# Patient Record
Sex: FEMALE | Race: WHITE | NOT HISPANIC OR LATINO
[De-identification: names, ages, dates, MRNs, and addresses within clinical notes are randomized per-mention and may not be internally consistent; named-entity substitution may affect disease eponyms.]

---

## 2021-06-03 PROBLEM — Z00.00 ENCOUNTER FOR PREVENTIVE HEALTH EXAMINATION: Status: ACTIVE | Noted: 2021-06-03

## 2022-10-12 ENCOUNTER — APPOINTMENT (OUTPATIENT)
Dept: RADIOLOGY | Facility: CLINIC | Age: 72
End: 2022-10-12

## 2022-10-12 PROCEDURE — 73502 X-RAY EXAM HIP UNI 2-3 VIEWS: CPT | Mod: LT

## 2022-10-12 PROCEDURE — 73610 X-RAY EXAM OF ANKLE: CPT | Mod: LT

## 2023-06-19 ENCOUNTER — FORM ENCOUNTER (OUTPATIENT)
Age: 73
End: 2023-06-19

## 2024-04-03 ENCOUNTER — APPOINTMENT (OUTPATIENT)
Dept: PHYSICAL MEDICINE AND REHAB | Facility: CLINIC | Age: 74
End: 2024-04-03
Payer: MEDICARE

## 2024-04-03 PROCEDURE — 20553 NJX 1/MLT TRIGGER POINTS 3/>: CPT

## 2024-04-03 PROCEDURE — 99205 OFFICE O/P NEW HI 60 MIN: CPT

## 2024-04-03 RX ORDER — RAMIPRIL 5 MG/1
CAPSULE ORAL
Refills: 0 | Status: ACTIVE | COMMUNITY

## 2024-04-03 RX ORDER — CYCLOBENZAPRINE HYDROCHLORIDE 5 MG/1
5 TABLET, FILM COATED ORAL TWICE DAILY
Qty: 40 | Refills: 0 | Status: ACTIVE | COMMUNITY
Start: 2024-04-03 | End: 1900-01-01

## 2024-04-03 RX ORDER — METHYLPREDNISOLONE 4 MG/1
4 TABLET ORAL
Qty: 1 | Refills: 0 | Status: ACTIVE | COMMUNITY
Start: 2024-04-03 | End: 1900-01-01

## 2024-04-03 RX ORDER — BISOPROLOL FUMARATE 5 MG/1
TABLET, FILM COATED ORAL
Refills: 0 | Status: ACTIVE | COMMUNITY

## 2024-04-03 NOTE — ASSESSMENT
[FreeTextEntry1] : MRI L/S   Medrol dose pack 4mg take as directed.   Flexeril 5mg po bid PRN #40   Home exercise plan reviewed with patient. Stressed the importance for the need for frequent change in position from sit to stand and stand to sit, as well as use of weight shifting techniques to alleviate low back discomfort. Instruction in proper posturing, body mechanics and lifting techniques.   At least 60 minutes was spent with patient face to face examining patient, reviewing findings/results, counseling patient and coordinating treatment program. Ample time was provided to answer any questions or address concerns to the patients satisfaction.  Recheck after MRI of the L/S is obtained for appropriate tx plan.

## 2024-04-03 NOTE — END OF VISIT
[FreeTextEntry3] :  The following information has been documented by Carolyn Munoz acting as a scribe. The documentation recorded by the scribe, in my presence, accurately reflects the service I, Dr. Kohli, personally performed, and the decisions made by me with my edits as appropriate.  [Time Spent: ___ minutes] : I have spent [unfilled] minutes of time on the encounter.

## 2024-04-04 ENCOUNTER — APPOINTMENT (OUTPATIENT)
Dept: MRI IMAGING | Facility: CLINIC | Age: 74
End: 2024-04-04
Payer: MEDICARE

## 2024-04-04 PROCEDURE — 72148 MRI LUMBAR SPINE W/O DYE: CPT | Mod: MH

## 2024-04-08 ENCOUNTER — APPOINTMENT (OUTPATIENT)
Dept: PHYSICAL MEDICINE AND REHAB | Facility: CLINIC | Age: 74
End: 2024-04-08
Payer: MEDICARE

## 2024-04-08 DIAGNOSIS — I10 ESSENTIAL (PRIMARY) HYPERTENSION: ICD-10-CM

## 2024-04-08 DIAGNOSIS — M51.9 UNSPECIFIED THORACIC, THORACOLUMBAR AND LUMBOSACRAL INTERVERTEBRAL DISC DISORDER: ICD-10-CM

## 2024-04-08 PROCEDURE — 99213 OFFICE O/P EST LOW 20 MIN: CPT

## 2024-04-09 PROBLEM — I10 HYPERTENSION, UNSPECIFIED TYPE: Status: ACTIVE | Noted: 2024-04-03

## 2024-04-09 NOTE — ASSESSMENT
[FreeTextEntry1] : Surgical consult Dr. Astorga-evaluate L1 compression fracture as well as severe lumbar spinal stenosis Recheck after consult   At least 20 minutes was spent with patient face to face examining patient, reviewing findings/results, counseling patient and coordinating treatment program. Ample time was provided to answer any questions or address concerns to the patient's satisfaction.

## 2024-04-09 NOTE — HISTORY OF PRESENT ILLNESS
[FreeTextEntry1] : Pt continues with c/o low back pain with radicular symptoms involving her left lower extremity. Pt reports was unable to tolerate MDP reports elevated BP. Pt states she went to the ER MDP was D/C. Pt was prescribed Amlodipine for hypertension and Percocet for pain and was discharged.  Pt reports some improvement following last sessions of TPI therapy.  MRI lumbar spine reviewed with patient/daughter patient is Martiniquais-speaking.

## 2024-04-09 NOTE — PHYSICAL EXAM
[FreeTextEntry1] : EXAMINATION OF LUMBAR SPINE & LOWER EXTREMITIES:   Reflexes (R) L/E:                   Quadriceps 2+                   Achilles 2+ Reflexes (L) L/E:                    Quadriceps 2+                    Achilles 2+   Sensory L/E: decrease sensation involving the left L4/L5 dermatome distribution.    Antalgic Gait patient using straight cane favoring left lower extremity.    Testing:                Babinski [ down going bilaterally] SI Jt Lig.Challenege Test (R) - SI Jt Lig.Challenege Test (L) + S.L.R. ( R ) -60 degrees S.L.R. ( L ) + 40 degrees  Range of motion testing was performed with the use of a goniometer.                           Flexion: 45 degrees (normal - 75-90)                           Extension: 5 degrees (normal - 30)                           Lateral Bending ( R ):20 degrees  (normal - 35)                           Lateral Bending ( L ):10 degrees (normal - 35)                           Thoracic Rotation ( R ): 25 degrees (normal - 35)                           Thoracic Rotation ( L ): 15 degrees (normal - 35)                           Internal Rotation Femur ( R ):WNL                           External Rotation Femur ( R ):WNL                            Internal Rotation Femur ( L ): mild to moderate restriction                            External Rotation Femur ( L ): WNL MMT: bilateral hip flexion 4+/5 volitional efforts compromised by pain. Left EHL 4+/5  Palpation of the Lumbar Spine: isolated trigger points identified left glutes maximums and Medius musculature.  Patient presents for evaluation of Lumbar Spine pain.    After today's examination additional trigger points were identified involving the left glutes maximums and Medius musculature.  , thus indicating the need for trigger point therapy.  Goals of which are to decrease pain, dissipate muscle spasm, increase ROM and improve level of function.   Sterile Technique Injection 2 Syringes of 5 cc 1 % Lidocaine HCL  left glutes maximums and Medius musculature.   Ice injection site PRN  Injection tolerated well.     Multiple areas were identified using palpation guidance. Using aseptic technique, each of these areas  left glutes maximums and Medius musculature.  were isolated and the skin prepped with alcohol.  A 22 gauge 1 1/2 inch needle was inserted to the level of the muscle. At this point, a slight twitch was elicited and in some cases the patient identified referred pain to areas distant from the injection site.  All of these were consistent with the definition of a trigger point.  Aspiration revealed no blood and a mixture of 5cc 1 % Lidocaine HCL was injected in increments of 3-4 mL into each of these areas for a total of 4 sites. The needle was removed. Hemostasis was achieved with direct pressure.  The patient tolerated the procedure well. Post-procedure exam did not reveal any new neurological deficits. The patient was instructed to call with fever, chills, increased pain, redness or swelling at the injection site, or numbness or weakness in the lower extremities. The patient was discharged home in good condition with post-procedural instructions.

## 2024-04-10 ENCOUNTER — APPOINTMENT (OUTPATIENT)
Dept: NEUROLOGY | Facility: CLINIC | Age: 74
End: 2024-04-10
Payer: MEDICARE

## 2024-04-10 VITALS
DIASTOLIC BLOOD PRESSURE: 76 MMHG | WEIGHT: 105 LBS | SYSTOLIC BLOOD PRESSURE: 131 MMHG | OXYGEN SATURATION: 98 % | HEIGHT: 60 IN | BODY MASS INDEX: 20.62 KG/M2 | HEART RATE: 78 BPM

## 2024-04-10 DIAGNOSIS — H93.19 TINNITUS, UNSPECIFIED EAR: ICD-10-CM

## 2024-04-10 PROCEDURE — 99205 OFFICE O/P NEW HI 60 MIN: CPT

## 2024-04-10 RX ORDER — MECLIZINE HYDROCHLORIDE 12.5 MG/1
12.5 TABLET ORAL
Qty: 30 | Refills: 3 | Status: COMPLETED | COMMUNITY
Start: 2024-04-10 | End: 2024-06-09

## 2024-04-10 RX ORDER — AZELASTINE HYDROCHLORIDE 137 UG/1
137 SPRAY, METERED NASAL
Qty: 30 | Refills: 0 | Status: ACTIVE | COMMUNITY
Start: 2024-04-05

## 2024-04-10 RX ORDER — AMLODIPINE BESYLATE 5 MG/1
5 TABLET ORAL
Qty: 14 | Refills: 0 | Status: ACTIVE | COMMUNITY
Start: 2024-04-07

## 2024-04-10 RX ORDER — OXYCODONE AND ACETAMINOPHEN 5; 325 MG/1; MG/1
5-325 TABLET ORAL
Qty: 12 | Refills: 0 | Status: ACTIVE | COMMUNITY
Start: 2024-04-07

## 2024-04-10 NOTE — REVIEW OF SYSTEMS
[As Noted in HPI] : as noted in HPI [Loss Of Hearing] : hearing loss [Negative] : Heme/Lymph [FreeTextEntry4] : tinnitus

## 2024-04-10 NOTE — PHYSICAL EXAM
[FreeTextEntry1] : GENERAL PHYSICAL EXAM: GEN: no distress, normal affect EYES: sclera white, conjunctiva clear, no nystagmus MSK: muscle tone and strength normal SKIN: warm, dry, no rash or lesion on exposed skin   NEUROLOGICAL EXAM: Mental Status Orientation: alert and oriented to person, place, time, and situation Language: clear and fluent, intact comprehension and repetition, Faroese Speaking   Cranial Nerves II: visual fields full to confrontation III, IV, VI: PERRL, EOMI V, VII: facial sensation and movement intact and symmetric VIII: hearing diminished bilaterally, perforated eardrums visualized bilaterally IX, X: uvula midline, soft palate elevates normally XI: BL shoulder shrug intact XII: tongue midline   Motor Bilateral muscle strength 5/5 in UE and LE, proximally and distally Tone and bulk are normal in upper and lower limbs   Sensation Intact to light touch in all 4 EXTs   Coordination Normal FTN bilaterally   Gait Normal stance, stride, and pivot turn

## 2024-04-10 NOTE — ASSESSMENT
[FreeTextEntry1] : Teetee Kennedy is a 74YO female, presenting today for bilateral ear ringing. On examination patient has severely perforated ear drums bilaterally, and therefore this is likely the cause of tinnitus and dizziness. Pt was strongly encouraged to consider the corrective surgery offered by the ENT.   Plan: - Follow-up with ENT again to discuss surgical options for treatment of tinnitus and perforated ear drums. - Follow-up with audiologist for hearing aids, to possibly help with the tinnitus.  - MRI Brain with IACs and MRA Head and Neck to rule out additional causes for tinnitus. - Recommend the patient try adding white noise to her home, especially at night to aid her sleeping. This has been shown to reduce the significance of tinnitus in some patients. - Labs to assess for reversible causes for dizziness.  - Meclizine 12.5mg as needed for dizziness.  - Will follow-up with results via telephone.   send copy of note to  [phone].

## 2024-04-10 NOTE — HISTORY OF PRESENT ILLNESS
[FreeTextEntry1] : Teetee Kennedy is a 72YO female, presenting today for ringing in the ears, referred by PCP. PMH of poorly controlled HTN, lumbar radiculopathy and herniated discs. She had MRI and MRA Head and Neck head 10/22, negative. Started 2 years ago, consistent buzzing in her head most of the time, she feels off balance, not dizzy. When she lays in bed its worse, and sometimes she hears a loud thump. She has seen ENT and they recommended surgery, but patient and her daughter did not feel that was necessary. She does report 75% hearing loss, has seen audiologist. She reports being outside makes the ringing less, worse when she is indoors in quiet setting. No other neurological complaints at this time.

## 2024-04-10 NOTE — DATA REVIEWED
[de-identified] : 10/2022: IMPRESSION: 1. No evidence of acute pathology. 2. Fluid-filled right mastoid air cells which is age indeterminate and may be chronic. Mastoiditis cannot be excluded. Please correlate clinically.  Final report: Preliminary report. Age-appropriate involutional and ischemic gliotic changes. [de-identified] : MRA Neck: 10/22: Right common carotid artery: No stenosis. No dissection or occlusion. Right internal carotid artery: No stenosis of the extracranial segment. No dissection or occlusion. Right external carotid artery: No stenosis. No dissection or occlusion of the origin. Right vertebral artery: No stenosis. No dissection or occlusion.  Left common carotid artery: No stenosis. No dissection or occlusion. Left internal carotid artery: No stenosis of the extracranial segment. No dissection or occlusion. Left external carotid artery: No stenosis. No dissection or occlusion of the origin. Left vertebral artery: No stenosis. No dissection or occlusion.  IMPRESSION: No evidence of vascular pathology.   MRA Head 10/22: POSTERIOR CIRCULATION: Right vertebral artery: No occlusion or significant stenosis. No aneurysm. Left vertebral artery: No occlusion or significant stenosis. No aneurysm. Basilar artery: No occlusion or significant stenosis. No aneurysm. Right posterior cerebral artery: No occlusion or significant stenosis. No aneurysm. Left posterior cerebral artery: No occlusion or significant stenosis. No aneurysm.  IMPRESSION: No evidence of vascular pathology.

## 2024-04-15 ENCOUNTER — APPOINTMENT (OUTPATIENT)
Dept: ORTHOPEDIC SURGERY | Facility: CLINIC | Age: 74
End: 2024-04-15
Payer: MEDICARE

## 2024-04-15 DIAGNOSIS — Z86.79 PERSONAL HISTORY OF OTHER DISEASES OF THE CIRCULATORY SYSTEM: ICD-10-CM

## 2024-04-15 DIAGNOSIS — Z86.39 PERSONAL HISTORY OF OTHER ENDOCRINE, NUTRITIONAL AND METABOLIC DISEASE: ICD-10-CM

## 2024-04-15 PROCEDURE — 99214 OFFICE O/P EST MOD 30 MIN: CPT

## 2024-04-15 NOTE — ASSESSMENT
[FreeTextEntry1] : 4/2024 MRI of L-Spine was reviewed today and is as follows:  Acute compression fracture L1 Chronic compression fracture L2 Congenital stenosis Severe stenosis L2-S1, worst at L2-3  74 yo female presents today for eval of her low back pain. MRI detailed above shows acute compression fracture and stenosis. I recommend bracing to allow fracture to heal at this time. Once fracture is healed, may consider TERESA for relief in low back pain.   - Patient given RX for LSO brace to provide stability and facilitate healing following injury to the lumbar spine.   - Recommend NSAIDs PRN - Recommend heating pad use to decrease muscle spasm - Discussed the importance of home exercises, including but not limited to hamstring stretching and core strengthening   Patient was educated on their diagnosis today. All questions answered and patient expressed understanding.  Follow up in 2 months

## 2024-04-15 NOTE — PHYSICAL EXAM
[de-identified] : Constitutional: Well groomed and developed.  Respiratory: Normal, unlabored breathing. No use of accessory muscles.  Skin: No rashes or ulcers. Skin warm and dry.  Psychiatric: Oriented to time, place, person and event. No acute distress. Gait: Ambulates with cane on the right    Lumbar spine:  Posture: Normal on coronal and sagittal alignment  AROM:  Flexion 60  Extension 10  Moderate pain with simulated truncal motion   Tenderness:  Thoracic: No tenderness on palpation  Lumbar: Moderate tenderness on palpation     TTP bilaterally L5-S1 Sacrum/coccyx: no tenderness on palpation  Greater trochanteric bursa: TTP on the left  PSIS: TTP on the left    Motor:                         R             L LE:                                IS                    5             5 Quad              5             5 TA                  5             5 EHL                5             5 Gastroc         5             5                 R  L DTR: Patella  2+  2+ Achilles  2+  2+  Sensory: Light touch sensation intact T12-S1  Babinski's Sign: Negative bilaterally  Straight leg raise test: Negative bilaterally  CARLOS test: negative bilaterally

## 2024-04-15 NOTE — HISTORY OF PRESENT ILLNESS
[de-identified] : (Pts daughter is translating from Bhutanese) Patient presents today with lower back pain ongoing for years- worsening x~3 weeks after coughing.  Pt reports pain and tightness in bilat sides of lower back and bilat hips  Reports pain radiating down left leg into the foot  Reports pain is worse with prolonged sitting, standing, walking, bending, etc Pt is taking Oxycodone 5mg hs with relief to sleep- was given prednisolone but had to go to ER with elevated BP  Pt denies treatments for her back in the past  Pt had MRI at [facility] per Dr Kohli  Pt is using a cane for ambulaiton.

## 2024-04-15 NOTE — REASON FOR VISIT
Preventive Health Recommendations  Female Ages 40 to 49    Yearly exam:     See your health care provider every year in order to  1. Review health changes.   2. Discuss preventive care.    3. Review your medicines if your doctor prescribed any.      Get a Pap test every three years (unless you have an abnormal result and your provider advises testing more often).      If you get Pap tests with HPV test, you only need to test every 5 years, unless you have an abnormal result. You do not need a Pap test if your uterus was removed (hysterectomy) and you have not had cancer.      You should be tested each year for STDs (sexually transmitted diseases), if you're at risk.     Ask your doctor if you should have a mammogram.      Have a colonoscopy (test for colon cancer) if someone in your family has had colon cancer or polyps before age 50.       Have a cholesterol test every 5 years.       Have a diabetes test (fasting glucose) after age 45. If you are at risk for diabetes, you should have this test every 3 years.    Shots: Get a flu shot each year. Get a tetanus shot every 10 years.     Nutrition:     Eat at least 5 servings of fruits and vegetables each day.    Eat whole-grain bread, whole-wheat pasta and brown rice instead of white grains and rice.    Get adequate Calcium and Vitamin D.      Lifestyle    Exercise at least 150 minutes a week (an average of 30 minutes a day, 5 days a week). This will help you control your weight and prevent disease.    Limit alcohol to one drink per day.    No smoking.     Wear sunscreen to prevent skin cancer.    See your dentist every six months for an exam and cleaning.   [FreeTextEntry2] : Back pain ongoing for years- increased ~4/1/2024 after coughing

## 2024-04-15 NOTE — DATA REVIEWED
[MRI] : MRI [Lumbar Spine] : lumbar spine [I independently reviewed and interpreted images and report] : I independently reviewed and interpreted images and report [FreeTextEntry1] : 4/2024 MRI of L-Spine was reviewed today and is as follows:  Acute compression fracture L1 Chronic compression fracture L2 Congenital stenosis Severe stenosis L2-S1, worst at L2-3

## 2024-04-19 ENCOUNTER — NON-APPOINTMENT (OUTPATIENT)
Age: 74
End: 2024-04-19

## 2024-05-28 ENCOUNTER — APPOINTMENT (OUTPATIENT)
Dept: NEUROLOGY | Facility: CLINIC | Age: 74
End: 2024-05-28
Payer: MEDICARE

## 2024-05-28 VITALS
WEIGHT: 105 LBS | DIASTOLIC BLOOD PRESSURE: 73 MMHG | HEIGHT: 60 IN | HEART RATE: 80 BPM | BODY MASS INDEX: 20.62 KG/M2 | SYSTOLIC BLOOD PRESSURE: 146 MMHG | OXYGEN SATURATION: 99 %

## 2024-05-28 DIAGNOSIS — M54.16 RADICULOPATHY, LUMBAR REGION: ICD-10-CM

## 2024-05-28 DIAGNOSIS — R42 DIZZINESS AND GIDDINESS: ICD-10-CM

## 2024-05-28 DIAGNOSIS — H93.13 TINNITUS, BILATERAL: ICD-10-CM

## 2024-05-28 DIAGNOSIS — R20.0 ANESTHESIA OF SKIN: ICD-10-CM

## 2024-05-28 PROCEDURE — G2211 COMPLEX E/M VISIT ADD ON: CPT

## 2024-05-28 PROCEDURE — 99215 OFFICE O/P EST HI 40 MIN: CPT

## 2024-05-28 RX ORDER — METHOCARBAMOL 500 MG/1
500 TABLET, FILM COATED ORAL
Qty: 30 | Refills: 1 | Status: ACTIVE | COMMUNITY
Start: 2024-05-28 | End: 1900-01-01

## 2024-05-28 RX ORDER — MELOXICAM 5 MG/1
5 CAPSULE ORAL
Qty: 30 | Refills: 1 | Status: ACTIVE | COMMUNITY
Start: 2024-05-28 | End: 1900-01-01

## 2024-05-28 NOTE — HISTORY OF PRESENT ILLNESS
[FreeTextEntry1] : Today, 5/28/24: MRA of the head and neck were negative for any acute findings, MRI of the head just showed left mastoid effusion suggestive of possible ear infection or sinus infection.  she saw Ortho for lower back pain recently, MRI of lumbar spine performed, which showed burst compression fracture at L1 and chronic compression fractures at L2.  Was given a brace to wear but feels it's so heavy it makes the pain worse. Mostly just wearing it for ambulation as tolerated.  She saw ENT for sinus infections in the past, but not recently.  Saw Pain management and was given 2 rounds of steroid injection but no improvement.  No new complaints.   Teetee Kennedy is a 74YO female, presenting today for ringing in the ears, referred by PCP. PMH of poorly controlled HTN, lumbar radiculopathy and herniated discs. She had MRI and MRA Head and Neck head 10/22, negative. Started 2 years ago, consistent buzzing in her head most of the time, she feels off balance, not dizzy. When she lays in bed its worse, and sometimes she hears a loud thump. She has seen ENT and they recommended surgery, but patient and her daughter did not feel that was necessary. She does report 75% hearing loss, has seen audiologist. She reports being outside makes the ringing less, worse when she is indoors in quiet setting. No other neurological complaints at this time.

## 2024-05-28 NOTE — ASSESSMENT
[FreeTextEntry1] : Teetee Kennedy is a 72YO female, presenting today for follow-up for tinnitus and MRI results.  MRI of the brain was negative except for left mastoid effusion suggestive of ear infections or sinus infection.  Patient does report a history of sinus infection and a pain in that area therefore this is the likely cause, and could be related to the dizziness.  Suggest patient return to ENT for sinus evaluation.  Patient is more concerned about lower back pain at this time and with daughter's help for translation reports the brace she was given by orthopedics is not helpful.  Patient has seen pain management and was given 2 rounds of steroid injections with no notable improvement.  Patient and daughter are curious if there is any other relief for this back pain.  Neurological exam remains unremarkable at this time.  Plan: -MRI of the cervical spine to assess for causes for cervicalgia some dizziness. -Referral to neurosurgery to assess lumbar compression fractures and spinal stenosis.  Assess if there are other options aside from the brace for pain management. - Physical therapy referral for when cleared from neurosurgeon to begin exercise, for lower back pain and numbness of the lower extremities. - Start meloxicam 5 mg twice daily as needed for chronic back pain.  Discussed the risks of NSAIDs with elevated blood pressure and encouraged to trial half a tablet and assess blood pressure while taking this medication. -Start methocarbamol 500 mg twice a day as needed for muscle spasms of the lower back.  Again, encouraged the patient to start with a half a tablet or 250 mg to assess for tolerance as muscle relaxers can cause fatigue and dizziness.  Patient encouraged not to use Flexeril any longer as this is not recommended and patient 73 years old. -Follow-up in 6 weeks after imaging and referrals completed.

## 2024-06-04 ENCOUNTER — APPOINTMENT (OUTPATIENT)
Dept: NEUROSURGERY | Facility: CLINIC | Age: 74
End: 2024-06-04
Payer: MEDICARE

## 2024-06-04 VITALS
HEART RATE: 80 BPM | BODY MASS INDEX: 20.62 KG/M2 | HEIGHT: 60 IN | WEIGHT: 105 LBS | SYSTOLIC BLOOD PRESSURE: 140 MMHG | DIASTOLIC BLOOD PRESSURE: 74 MMHG

## 2024-06-04 DIAGNOSIS — M48.061 SPINAL STENOSIS, LUMBAR REGION WITHOUT NEUROGENIC CLAUDICATION: ICD-10-CM

## 2024-06-04 DIAGNOSIS — M53.3 SACROCOCCYGEAL DISORDERS, NOT ELSEWHERE CLASSIFIED: ICD-10-CM

## 2024-06-04 DIAGNOSIS — S32.000A WEDGE COMPRESSION FRACTURE OF UNSPECIFIED LUMBAR VERTEBRA, INITIAL ENCOUNTER FOR CLOSED FRACTURE: ICD-10-CM

## 2024-06-04 PROCEDURE — 99204 OFFICE O/P NEW MOD 45 MIN: CPT

## 2024-06-04 RX ORDER — NAPROXEN 500 MG/1
500 TABLET ORAL DAILY
Qty: 30 | Refills: 0 | Status: ACTIVE | COMMUNITY
Start: 2024-06-04 | End: 1900-01-01

## 2024-06-04 NOTE — DATA REVIEWED
[de-identified] : Was reviewed of the lumbar spine (disc returned to the patient -4/4/2024): L2-3 moderate to severe stenosis; chronic L2 fracture; L1 subacute compression fracture deformity; degenerative disc disease

## 2024-06-04 NOTE — PHYSICAL EXAM
[General Appearance - Alert] : alert [General Appearance - In No Acute Distress] : in no acute distress [General Appearance - Well Nourished] : well nourished [General Appearance - Well Developed] : well developed [General Appearance - Well-Appearing] : healthy appearing [] : normal voice and communication [Oriented To Time, Place, And Person] : oriented to person, place, and time [Impaired Insight] : insight and judgment were intact [Affect] : the affect was normal [Mood] : the mood was normal [Memory Recent] : recent memory was not impaired [Memory Remote] : remote memory was not impaired [Person] : oriented to person [Place] : oriented to place [Time] : oriented to time [Motor Tone] : muscle tone was normal in all four extremities [FreeTextEntry8] : The patient ambulates with the aid of a cane.  Pain noted bilaterally in the SI joints to palpation

## 2024-06-04 NOTE — ASSESSMENT
[FreeTextEntry1] : Discussed the history, physical examination findings, and recent imaging with the patient and her daughter with all questions answered.  Conservative treatment recommended at this time with rest and modified activity, medications as tolerated, physical therapy, and pain management referrals which have been provided today.  The patient may follow-up if conservative treatments have not been effective.  Discussed oral versus topical anti-inflammatory medication.  Naproxen 500 mg has been sent to the patient's pharmacy with instructions to take with food and to discontinue if associated with GI upset.  The next visit can be with the neurosurgery attending for formal surgical discussion.

## 2024-06-04 NOTE — HISTORY OF PRESENT ILLNESS
[de-identified] : 73-year-old female presents to the neurosurgery office for an initial office visit accompanied by her daughter for evaluation of low back pain and lower extremity radicular symptoms.  The patient was referred to our office by the neurology service who initially consulted with the patient.  The patient is here today with an MRI from [facility] which demonstrates a chronic L2 fracture and an L1 subacute compression fracture deformity. she denies any contributory history or trauma leading to her back pain and compression fracture history.  The patient ambulates with the aid the patient has no other complaints at present.  A cane.  She reports intermittent lower extremity radicular symptoms.  Denies conservative treatment with physical therapy at this time.

## 2024-06-12 ENCOUNTER — APPOINTMENT (OUTPATIENT)
Dept: NEUROLOGY | Facility: CLINIC | Age: 74
End: 2024-06-12
Payer: MEDICARE

## 2024-06-12 VITALS
HEIGHT: 60 IN | SYSTOLIC BLOOD PRESSURE: 144 MMHG | HEART RATE: 68 BPM | WEIGHT: 105 LBS | BODY MASS INDEX: 20.62 KG/M2 | DIASTOLIC BLOOD PRESSURE: 76 MMHG | OXYGEN SATURATION: 99 %

## 2024-06-12 DIAGNOSIS — G44.209 TENSION-TYPE HEADACHE, UNSPECIFIED, NOT INTRACTABLE: ICD-10-CM

## 2024-06-12 DIAGNOSIS — M54.2 CERVICALGIA: ICD-10-CM

## 2024-06-12 DIAGNOSIS — K21.9 GASTRO-ESOPHAGEAL REFLUX DISEASE W/OUT ESOPHAGITIS: ICD-10-CM

## 2024-06-12 DIAGNOSIS — M62.830 MUSCLE SPASM OF BACK: ICD-10-CM

## 2024-06-12 DIAGNOSIS — M62.838 OTHER MUSCLE SPASM: ICD-10-CM

## 2024-06-12 PROCEDURE — 99204 OFFICE O/P NEW MOD 45 MIN: CPT

## 2024-06-12 RX ORDER — PANTOPRAZOLE 40 MG/1
40 TABLET, DELAYED RELEASE ORAL
Qty: 30 | Refills: 3 | Status: ACTIVE | COMMUNITY
Start: 2024-06-12 | End: 1900-01-01

## 2024-06-12 NOTE — PROCEDURE
[FreeTextEntry1] : Procedure: Trigger Point Injections (20553) Diagnosis: Myalgia (m79.1) Complications: None Surgeon: GURWINDER Keating Anesthesia: None Procedure in detail: After explaining the potential risks of the above stated procedure which include infection, bleeding, bruising, no change in pain, increase in pain, nerve injury, local anesthetic side effect, muscle weakness, pneumothorax, low blood pressure, low heart rate, abnormal heart rhythms (arrhythmias), the patient agreed to undergo the procedure.   Using one 5 ml syringe, 5 ml of 2% lidocaine was drawn up (NDC 9314-6985-59)   Trigger points were palpated, identified using palpation guidance, and after confirmation from the patient regarding locations, the areas were cleaned with an alcohol swab. In some cases, the patient identified referred pain to areas distant from the injection site. A 30 gauge 1/2 inch needle was inserted and advanced to the belly of the affected muscles, and after gentle aspiration, 0.5 to 1 mL was injected per site. The needle was removed. Hemostasis was achieved with direct pressure.  A total of 8 ml was used.   The following muscles were injected: Right gluteus medius, right and left lumbar paraspinal muscles   The patient tolerated the procedure well. Post-procedure exam did not reveal any new neurological deficits. The patient was instructed to call with fever, chills, increased pain, redness or swelling at the injection site, or numbness or weakness in the upper extremities. The patient was discharged home in good condition with post-procedural instructions.

## 2024-06-12 NOTE — HISTORY OF PRESENT ILLNESS
[FreeTextEntry1] : 73-year-old Guatemalan speaking woman with history of L1-L2 compression fracture with radiculopathy here for follow-up and to fill out disability papers.  Patient normally sees NP Brenda Bauer. Since last visit she was seen by neurosurgery, referred to pain management. She "dizziness", there is constant loud noise in her head. As per daughter she has poor memory, fatigue and frustration. As per ENT she has 75% hearing loss. There is leg stiffness and cramping in the legs, can be very tight.  She has daily headache, frontal and top of the head. She has been on gabapentin 300mg 1-2 times a day.  family is trying to get citizenship for her and she needs  paperwork for exemption from the testing.   last visit, 5/28/24: MRA of the head and neck were negative for any acute findings, MRI of the head just showed left mastoid effusion suggestive of possible ear infection or sinus infection.  she saw Ortho for lower back pain recently, MRI of lumbar spine performed, which showed burst compression fracture at L1 and chronic compression fractures at L2.  Was given a brace to wear but feels it's so heavy it makes the pain worse. Mostly just wearing it for ambulation as tolerated.  She saw ENT for sinus infections in the past, but not recently.  Saw Pain management and was given 2 rounds of steroid injection but no improvement.  No new complaints.   Teetee Kennedy is a 74YO female, presenting today for ringing in the ears, referred by PCP. PMH of poorly controlled HTN, lumbar radiculopathy and herniated discs. She had MRI and MRA Head and Neck head 10/22, negative. Started 2 years ago, consistent buzzing in her head most of the time, she feels off balance, not dizzy. When she lays in bed its worse, and sometimes she hears a loud thump. She has seen ENT and they recommended surgery, but patient and her daughter did not feel that was necessary. She does report 75% hearing loss, has seen audiologist. She reports being outside makes the ringing less, worse when she is indoors in quiet setting. No other neurological complaints at this time.

## 2024-06-12 NOTE — PHYSICAL EXAM
[General Appearance - Alert] : alert [General Appearance - In No Acute Distress] : in no acute distress [Oriented To Time, Place, And Person] : oriented to person, place, and time [Impaired Insight] : insight and judgment were intact [Affect] : the affect was normal [Person] : oriented to person [Place] : oriented to place [Time] : oriented to time [Cranial Nerves Optic (II)] : visual acuity intact bilaterally,  visual fields full to confrontation, pupils equal round and reactive to light [Cranial Nerves Oculomotor (III)] : extraocular motion intact [Cranial Nerves Trigeminal (V)] : facial sensation intact symmetrically [Cranial Nerves Facial (VII)] : face symmetrical [Cranial Nerves Vestibulocochlear (VIII)] : hearing was intact bilaterally [Cranial Nerves Glossopharyngeal (IX)] : tongue and palate midline [Cranial Nerves Accessory (XI - Cranial And Spinal)] : head turning and shoulder shrug symmetric [Cranial Nerves Hypoglossal (XII)] : there was no tongue deviation with protrusion [Motor Handedness Right-Handed] : the patient is right hand dominant [Sensation Tactile Decrease] : light touch was intact [Sensation Pain / Temperature Decrease] : pain and temperature was intact [2+] : Brachioradialis left 2+ [1+] : Ankle jerk left 1+ [FreeTextEntry8] : antalgic gait, walks with cane [Sclera] : the sclera and conjunctiva were normal [PERRL With Normal Accommodation] : pupils were equal in size, round, reactive to light, with normal accommodation [Extraocular Movements] : extraocular movements were intact [Full Visual Field] : full visual field [Neck Appearance] : the appearance of the neck was normal [] : no respiratory distress [Respiration, Rhythm And Depth] : normal respiratory rhythm and effort [FreeTextEntry1] : parapsinal tenderness with trigger points bilaterally, indentation of the gluteous muscles with spasm [Nail Clubbing] : no clubbing  or cyanosis of the fingernails [Musculoskeletal - Swelling] : no joint swelling seen

## 2024-06-12 NOTE — ASSESSMENT
[FreeTextEntry1] : Teetee Kennedy is a 74YO female, presenting today for follow-up of tinnitus, lower back pain, headache. MRI of the brain was negative except for left mastoid effusion suggestive of ear infections or sinus infection.  Patient does report a history of sinus infection and a pain in that area therefore this is the likely cause, and could be related to the dizziness.  Suggest patient return to ENT for sinus evaluation.  She was seen by neurosurgery and referred to follow-up with pain management for possible epidural injections.  On examination she has severe muscle spasms involving the entire back which is likely what is causing restriction in her neck movement, headaches, and impacting her walking.   Plan: -Follow-up with pain management.  - Physical therapy referral for when cleared from neurosurgeon to begin exercise, for lower back pain and numbness of the lower extremities. -Continue meloxicam 5 mg twice daily as needed for chronic back pain.  Trigger point injections done in office today.  She can either have it done with pain management or can return here for further treatments. -Start methocarbamol 500 mg twice a day as needed for muscle spasms of the lower back.  Can start at night to assess tolerability. -Encouraged heat or cold packs to the neck and lower back -Magnesium 400 mg, riboflavin 400 mg and co-Q10 300 mg daily -Follow-up in 6 weeks

## 2024-06-17 ENCOUNTER — APPOINTMENT (OUTPATIENT)
Dept: ORTHOPEDIC SURGERY | Facility: CLINIC | Age: 74
End: 2024-06-17
Payer: MEDICARE

## 2024-06-17 DIAGNOSIS — M51.36 OTHER INTERVERTEBRAL DISC DEGENERATION, LUMBAR REGION: ICD-10-CM

## 2024-06-17 DIAGNOSIS — M48.061 SPINAL STENOSIS, LUMBAR REGION WITHOUT NEUROGENIC CLAUDICATION: ICD-10-CM

## 2024-06-17 DIAGNOSIS — M47.817 SPONDYLOSIS W/OUT MYELOPATHY OR RADICULOPATHY, LUMBOSACRAL REGION: ICD-10-CM

## 2024-06-17 DIAGNOSIS — M70.62 TROCHANTERIC BURSITIS, LEFT HIP: ICD-10-CM

## 2024-06-17 DIAGNOSIS — S32.020A WEDGE COMPRESSION FRACTURE OF SECOND LUMBAR VERTEBRA, INITIAL ENCOUNTER FOR CLOSED FRACTURE: ICD-10-CM

## 2024-06-17 DIAGNOSIS — Z96.642 PRESENCE OF LEFT ARTIFICIAL HIP JOINT: ICD-10-CM

## 2024-06-17 DIAGNOSIS — S32.010A WEDGE COMPRESSION FRACTURE OF FIRST LUMBAR VERTEBRA, INITIAL ENCOUNTER FOR CLOSED FRACTURE: ICD-10-CM

## 2024-06-17 DIAGNOSIS — M51.27 OTHER INTERVERTEBRAL DISC DISPLACEMENT, LUMBOSACRAL REGION: ICD-10-CM

## 2024-06-17 DIAGNOSIS — M51.37 OTHER INTERVERTEBRAL DISC DEGENERATION, LUMBOSACRAL REGION: ICD-10-CM

## 2024-06-17 PROCEDURE — 72100 X-RAY EXAM L-S SPINE 2/3 VWS: CPT

## 2024-06-17 PROCEDURE — 73522 X-RAY EXAM HIPS BI 3-4 VIEWS: CPT

## 2024-06-17 PROCEDURE — 99213 OFFICE O/P EST LOW 20 MIN: CPT

## 2024-06-17 RX ORDER — DICLOFENAC SODIUM 1% 10 MG/G
1 GEL TOPICAL 4 TIMES DAILY
Qty: 1 | Refills: 1 | Status: ACTIVE | COMMUNITY
Start: 2024-06-17 | End: 1900-01-01

## 2024-06-27 NOTE — PHYSICAL EXAM
[de-identified] : Constitutional: Well groomed and developed.  Respiratory: Normal, unlabored breathing. No use of accessory muscles.  Skin: No rashes or ulcers. Skin warm and dry.  Psychiatric: Oriented to time, place, person and event. No acute distress. Gait: Ambulates with cane on the right    Lumbar spine:  Posture: Normal on coronal and sagittal alignment  AROM:  Flexion 60  Extension 10  Moderate pain with simulated truncal motion   Tenderness:  Thoracic: No tenderness on palpation  Lumbar: Moderate tenderness on palpation     No TTP L1, L2    TTP bilaterally L5-S1 Sacrum/coccyx: no tenderness on palpation  Greater trochanteric bursa: TTP on the left  PSIS: TTP on the left    Motor:                         R             L LE:                                IS                    5             5 Quad              5             5 TA                  5             5 EHL                5             5 Gastroc         5             5                 R  L DTR: Patella  2+  2+ Achilles  2+  2+  Sensory: Light touch sensation intact T12-S1  Babinski's Sign: Negative bilaterally  Straight leg raise test: Negative bilaterally  CARLOS test: negative bilaterally

## 2024-06-27 NOTE — HISTORY OF PRESENT ILLNESS
[de-identified] : (Pts daughter is translating from Beninese) Follow up lumber spine compression fracture L1. Patient states she saw a neurosurgeon and neurologist. Patient states her pain radiates into the left hip and down the leg. Patient states she feels increased pain with prolonged sitting and standing and rising from a sitting to standing position. Patient admits to taking Tylenol Arthritis for pain. Patient is using a cane for ambulation. Patient admits to trying LSO brace but D/C due to heaviness and discomfort.  Today's Pain 8/10

## 2024-06-27 NOTE — ASSESSMENT
[FreeTextEntry1] : 4/2024 MRI of L-Spine was reviewed today and is as follows:  Acute compression fracture L1 Chronic compression fracture L2 Congenital stenosis Severe stenosis L2-S1, worst at L2-3  72 yo female presents today for eval of her low back pain. XR of the back shows fracture is stable. Patient without pain over the fracture site. She has symptoms consistent with left   - D/C LSO brace   - Recommend physical therapy to regain range of motion, strengthening and symptomatic improvement. Prescription given in office today.   - Prescription given for Voltaren gel today. Advised patient to apply to the area of pain as needed.   - Recommend NSAIDs PRN - Recommend heating pad use to decrease muscle spasm - Discussed the importance of home exercises, including but not limited to hamstring stretching and core strengthening   Patient was educated on their diagnosis today. All questions answered and patient expressed understanding.  Follow up in 2 months

## 2024-06-27 NOTE — IMAGING
[Facet arthropathy] : Facet arthropathy [Disc space narrowing] : Disc space narrowing [Bilateral] : hip with pelvis bilaterally [AP] : anteroposterior [Lateral] : lateral [FreeTextEntry1] : Compression fracture stable [FreeTextEntry9] : WF/WA prosthesis on the L

## 2024-07-09 ENCOUNTER — APPOINTMENT (OUTPATIENT)
Dept: NEUROLOGY | Facility: CLINIC | Age: 74
End: 2024-07-09

## 2024-08-05 ENCOUNTER — APPOINTMENT (OUTPATIENT)
Dept: NEUROLOGY | Facility: CLINIC | Age: 74
End: 2024-08-05

## 2024-08-05 PROBLEM — R09.89 POOR CIRCULATION OF EXTREMITY: Status: ACTIVE | Noted: 2024-08-05

## 2024-08-05 PROCEDURE — 99214 OFFICE O/P EST MOD 30 MIN: CPT

## 2024-08-05 PROCEDURE — G2211 COMPLEX E/M VISIT ADD ON: CPT

## 2024-08-05 NOTE — HISTORY OF PRESENT ILLNESS
[FreeTextEntry1] : Today Aug 05: Daughter present providing translation in [place]an. She had follow up with Dr. Astorga after L spine compression fracture. Recommended to see pain management and start Physical therapy.  She still has severe lower back pain. She also complains of leg cramping and pain radiating down the shin into the ankle with numbness. Her neck pain has improved after the trigger point injections last visit. She has intermittent dizziness, feeling of imbalance. She has suddenly passed out in the past without known cause. She does endorse palpitations and chest pressure. She had seen cardiology in the past and was supposed to have a stress test but then she was unable due to the leg pain and hip replacement.    73-year-old [place]an speaking woman with history of L1-L2 compression fracture with radiculopathy here for follow-up and to fill out disability papers.  Patient normally sees NP Brenda Bauer. Since last visit she was seen by neurosurgery, referred to pain management. She "dizziness", there is constant loud noise in her head. As per daughter she has poor memory, fatigue and frustration. As per ENT she has 75% hearing loss. There is leg stiffness and cramping in the legs, can be very tight.  She has daily headache, frontal and top of the head. She has been on gabapentin 300mg 1-2 times a day.  family is trying to get citizenship for her and she needs  paperwork for exemption from the testing.   last visit, 5/28/24: MRA of the head and neck were negative for any acute findings, MRI of the head just showed left mastoid effusion suggestive of possible ear infection or sinus infection.  she saw Ortho for lower back pain recently, MRI of lumbar spine performed, which showed burst compression fracture at L1 and chronic compression fractures at L2.  Was given a brace to wear but feels it's so heavy it makes the pain worse. Mostly just wearing it for ambulation as tolerated.  She saw ENT for sinus infections in the past, but not recently.  Saw Pain management and was given 2 rounds of steroid injection but no improvement.  No new complaints.   Teetee Kennedy is a 74YO female, presenting today for ringing in the ears, referred by PCP. PMH of poorly controlled HTN, lumbar radiculopathy and herniated discs. She had MRI and MRA Head and Neck head 10/22, negative. Started 2 years ago, consistent buzzing in her head most of the time, she feels off balance, not dizzy. When she lays in bed its worse, and sometimes she hears a loud thump. She has seen ENT and they recommended surgery, but patient and her daughter did not feel that was necessary. She does report 75% hearing loss, has seen audiologist. She reports being outside makes the ringing less, worse when she is indoors in quiet setting. No other neurological complaints at this time.

## 2024-08-05 NOTE — HISTORY OF PRESENT ILLNESS
[FreeTextEntry1] : Today Aug 05: Daughter present providing translation in [place]. She had follow up with Dr. Astorga after L spine compression fracture. Recommended to see pain management and start Physical therapy.  She still has severe lower back pain. She also complains of leg cramping and pain radiating down the shin into the ankle with numbness. Her neck pain has improved after the trigger point injections last visit. She has intermittent dizziness, feeling of imbalance. She has suddenly passed out in the past without known cause. She does endorse palpitations and chest pressure. She had seen cardiology in the past and was supposed to have a stress test but then she was unable due to the leg pain and hip replacement.    73-year-old [place] Korean speaking woman with history of L1-L2 compression fracture with radiculopathy here for follow-up and to fill out disability papers.  Patient normally sees NP Brenda Bauer. Since last visit she was seen by neurosurgery, referred to pain management. She "dizziness", there is constant loud noise in her head. As per daughter she has poor memory, fatigue and frustration. As per ENT she has 75% hearing loss. There is leg stiffness and cramping in the legs, can be very tight.  She has daily headache, frontal and top of the head. She has been on gabapentin 300mg 1-2 times a day.  family is trying to get citizenship for her and she needs  paperwork for exemption from the testing.   last visit, 5/28/24: MRA of the head and neck were negative for any acute findings, MRI of the head just showed left mastoid effusion suggestive of possible ear infection or sinus infection.  she saw Ortho for lower back pain recently, MRI of lumbar spine performed, which showed burst compression fracture at L1 and chronic compression fractures at L2.  Was given a brace to wear but feels it's so heavy it makes the pain worse. Mostly just wearing it for ambulation as tolerated.  She saw ENT for sinus infections in the past, but not recently.  Saw Pain management and was given 2 rounds of steroid injection but no improvement.  No new complaints.   Teetee Kennedy is a 74YO female, presenting today for ringing in the ears, referred by PCP. PMH of poorly controlled HTN, lumbar radiculopathy and herniated discs. She had MRI and MRA Head and Neck head 10/22, negative. Started 2 years ago, consistent buzzing in her head most of the time, she feels off balance, not dizzy. When she lays in bed its worse, and sometimes she hears a loud thump. She has seen ENT and they recommended surgery, but patient and her daughter did not feel that was necessary. She does report 75% hearing loss, has seen audiologist. She reports being outside makes the ringing less, worse when she is indoors in quiet setting. No other neurological complaints at this time.

## 2024-08-05 NOTE — ASSESSMENT
[FreeTextEntry1] : Teetee Kennedy is a 72YO female, presenting today for follow-up of tinnitus, lower back pain,  dizziness.  On examination she has severe muscle spasms involving the entire back which is likely what is causing her back pain. On review of her MRI lumbar spine there was severe L3-L4 foraminal narrowing and ligamentum flavum hypertrophy at the lower levels.  She likely has L4 and L6 radiculopathy with reactive muscle spasm.  Her dizziness sounds cardiogenic in nature given the sudden LOC and palpitations. She should complete her workup.    Plan: -Follow-up with pain management.  - Physical therapy referral for when cleared from neurosurgeon to begin exercise, for lower back pain and numbness of the lower extremities. - Referral to cardiology - Vestibular function test - LE duplex to r/o venous insufficiency given leg swelling -Continue meloxicam 5 mg twice daily as needed for chronic back pain.  Trigger point injections done in office today.  She can either have it done with pain management or can return here for further treatments. -Encouraged heat or cold packs to the neck and lower back -Magnesium 400 mg, riboflavin 400 mg and co-Q10 300 mg daily -Follow-up in 6 weeks

## 2024-08-05 NOTE — PHYSICAL EXAM
[General Appearance - Alert] : alert [General Appearance - In No Acute Distress] : in no acute distress [Oriented To Time, Place, And Person] : oriented to person, place, and time [Impaired Insight] : insight and judgment were intact [Affect] : the affect was normal [Person] : oriented to person [Place] : oriented to place [Time] : oriented to time [Cranial Nerves Optic (II)] : visual acuity intact bilaterally,  visual fields full to confrontation, pupils equal round and reactive to light [Cranial Nerves Oculomotor (III)] : extraocular motion intact [Cranial Nerves Trigeminal (V)] : facial sensation intact symmetrically [Cranial Nerves Facial (VII)] : face symmetrical [Cranial Nerves Vestibulocochlear (VIII)] : hearing was intact bilaterally [Cranial Nerves Glossopharyngeal (IX)] : tongue and palate midline [Cranial Nerves Accessory (XI - Cranial And Spinal)] : head turning and shoulder shrug symmetric [Cranial Nerves Hypoglossal (XII)] : there was no tongue deviation with protrusion [Motor Handedness Right-Handed] : the patient is right hand dominant [Sensation Tactile Decrease] : light touch was intact [2+] : Brachioradialis left 2+ [1+] : Ankle jerk left 1+ [Sclera] : the sclera and conjunctiva were normal [PERRL With Normal Accommodation] : pupils were equal in size, round, reactive to light, with normal accommodation [Extraocular Movements] : extraocular movements were intact [Full Visual Field] : full visual field [Neck Appearance] : the appearance of the neck was normal [] : no respiratory distress [Respiration, Rhythm And Depth] : normal respiratory rhythm and effort [Nail Clubbing] : no clubbing  or cyanosis of the fingernails [Musculoskeletal - Swelling] : no joint swelling seen [FreeTextEntry7] : decreased sensation to LT and temperature left L4 and L5 dermatomes. [FreeTextEntry8] : antalgic gait, walks with cane [FreeTextEntry1] : parapsinal tenderness with trigger points bilaterally, indentation of the gluteous muscles with spasm

## 2024-08-15 ENCOUNTER — APPOINTMENT (OUTPATIENT)
Dept: CARDIOLOGY | Facility: CLINIC | Age: 74
End: 2024-08-15

## 2024-08-19 ENCOUNTER — APPOINTMENT (OUTPATIENT)
Dept: ORTHOPEDIC SURGERY | Facility: CLINIC | Age: 74
End: 2024-08-19

## 2024-09-16 ENCOUNTER — APPOINTMENT (OUTPATIENT)
Dept: NEUROLOGY | Facility: CLINIC | Age: 74
End: 2024-09-16

## 2024-10-08 ENCOUNTER — NON-APPOINTMENT (OUTPATIENT)
Age: 74
End: 2024-10-08

## 2024-11-06 ENCOUNTER — RX RENEWAL (OUTPATIENT)
Age: 74
End: 2024-11-06

## 2024-11-19 ENCOUNTER — APPOINTMENT (OUTPATIENT)
Dept: NEUROLOGY | Facility: CLINIC | Age: 74
End: 2024-11-19

## 2024-11-19 VITALS
HEART RATE: 69 BPM | WEIGHT: 105 LBS | DIASTOLIC BLOOD PRESSURE: 77 MMHG | HEIGHT: 60 IN | OXYGEN SATURATION: 97 % | SYSTOLIC BLOOD PRESSURE: 128 MMHG | BODY MASS INDEX: 20.62 KG/M2

## 2024-11-19 DIAGNOSIS — M54.16 RADICULOPATHY, LUMBAR REGION: ICD-10-CM

## 2024-11-19 DIAGNOSIS — R42 DIZZINESS AND GIDDINESS: ICD-10-CM

## 2024-11-19 DIAGNOSIS — M53.3 SACROCOCCYGEAL DISORDERS, NOT ELSEWHERE CLASSIFIED: ICD-10-CM

## 2024-11-19 DIAGNOSIS — R20.0 ANESTHESIA OF SKIN: ICD-10-CM

## 2024-11-19 DIAGNOSIS — H93.13 TINNITUS, BILATERAL: ICD-10-CM

## 2024-11-19 PROCEDURE — 99215 OFFICE O/P EST HI 40 MIN: CPT

## 2024-11-19 PROCEDURE — G2211 COMPLEX E/M VISIT ADD ON: CPT

## 2024-11-21 RX ORDER — MELOXICAM 7.5 MG/1
7.5 TABLET ORAL TWICE DAILY
Qty: 30 | Refills: 2 | Status: ACTIVE | COMMUNITY
Start: 2024-11-21 | End: 1900-01-01

## 2024-11-27 ENCOUNTER — APPOINTMENT (OUTPATIENT)
Dept: PHYSICAL MEDICINE AND REHAB | Facility: CLINIC | Age: 74
End: 2024-11-27

## 2024-12-31 PROBLEM — I07.1 TRICUSPID REGURGITATION: Status: ACTIVE | Noted: 2024-12-31

## 2024-12-31 PROBLEM — I34.0 MITRAL REGURGITATION: Status: ACTIVE | Noted: 2024-12-31

## 2025-01-07 ENCOUNTER — APPOINTMENT (OUTPATIENT)
Dept: CARDIOTHORACIC SURGERY | Facility: CLINIC | Age: 75
End: 2025-01-07
Payer: MEDICARE

## 2025-01-07 VITALS
HEART RATE: 69 BPM | WEIGHT: 110 LBS | DIASTOLIC BLOOD PRESSURE: 91 MMHG | HEIGHT: 59.84 IN | SYSTOLIC BLOOD PRESSURE: 154 MMHG | BODY MASS INDEX: 21.6 KG/M2 | OXYGEN SATURATION: 100 % | RESPIRATION RATE: 18 BRPM

## 2025-01-07 DIAGNOSIS — Z78.9 OTHER SPECIFIED HEALTH STATUS: ICD-10-CM

## 2025-01-07 DIAGNOSIS — I07.1 RHEUMATIC TRICUSPID INSUFFICIENCY: ICD-10-CM

## 2025-01-07 DIAGNOSIS — I34.0 NONRHEUMATIC MITRAL (VALVE) INSUFFICIENCY: ICD-10-CM

## 2025-01-07 DIAGNOSIS — Z83.3 FAMILY HISTORY OF DIABETES MELLITUS: ICD-10-CM

## 2025-01-07 DIAGNOSIS — Z82.3 FAMILY HISTORY OF STROKE: ICD-10-CM

## 2025-01-07 PROCEDURE — 99205 OFFICE O/P NEW HI 60 MIN: CPT

## 2025-01-13 ENCOUNTER — RX RENEWAL (OUTPATIENT)
Age: 75
End: 2025-01-13

## 2025-04-07 ENCOUNTER — APPOINTMENT (OUTPATIENT)
Dept: NEUROLOGY | Facility: CLINIC | Age: 75
End: 2025-04-07
Payer: MEDICARE

## 2025-04-07 VITALS
DIASTOLIC BLOOD PRESSURE: 78 MMHG | HEART RATE: 77 BPM | BODY MASS INDEX: 22.18 KG/M2 | SYSTOLIC BLOOD PRESSURE: 133 MMHG | HEIGHT: 59 IN | OXYGEN SATURATION: 98 % | WEIGHT: 110 LBS

## 2025-04-07 DIAGNOSIS — M54.50 LOW BACK PAIN, UNSPECIFIED: ICD-10-CM

## 2025-04-07 DIAGNOSIS — R19.00 INTRA-ABDOMINAL AND PELVIC SWELLING, MASS AND LUMP, UNSPECIFIED SITE: ICD-10-CM

## 2025-04-07 DIAGNOSIS — M54.16 RADICULOPATHY, LUMBAR REGION: ICD-10-CM

## 2025-04-07 PROCEDURE — 99214 OFFICE O/P EST MOD 30 MIN: CPT

## 2025-04-07 RX ORDER — TRAMADOL HYDROCHLORIDE 50 MG/1
50 TABLET, COATED ORAL
Qty: 60 | Refills: 0 | Status: ACTIVE | COMMUNITY
Start: 2025-04-07 | End: 1900-01-01

## 2025-05-12 ENCOUNTER — APPOINTMENT (OUTPATIENT)
Dept: PHYSICAL MEDICINE AND REHAB | Facility: CLINIC | Age: 75
End: 2025-05-12

## 2025-05-27 RX ORDER — CYCLOBENZAPRINE HYDROCHLORIDE 5 MG/1
5 TABLET, FILM COATED ORAL TWICE DAILY
Qty: 30 | Refills: 2 | Status: ACTIVE | COMMUNITY
Start: 2025-05-27 | End: 1900-01-01

## 2025-08-08 ENCOUNTER — APPOINTMENT (OUTPATIENT)
Dept: ORTHOPEDIC SURGERY | Facility: CLINIC | Age: 75
End: 2025-08-08
Payer: MEDICARE

## 2025-08-08 VITALS — HEIGHT: 59 IN | BODY MASS INDEX: 22.58 KG/M2 | WEIGHT: 112 LBS

## 2025-08-08 DIAGNOSIS — M54.16 RADICULOPATHY, LUMBAR REGION: ICD-10-CM

## 2025-08-08 DIAGNOSIS — M51.369: ICD-10-CM

## 2025-08-08 DIAGNOSIS — Z87.19 PERSONAL HISTORY OF OTHER DISEASES OF THE DIGESTIVE SYSTEM: ICD-10-CM

## 2025-08-08 DIAGNOSIS — M47.817 SPONDYLOSIS W/OUT MYELOPATHY OR RADICULOPATHY, LUMBOSACRAL REGION: ICD-10-CM

## 2025-08-08 DIAGNOSIS — S32.020A WEDGE COMPRESSION FRACTURE OF SECOND LUMBAR VERTEBRA, INITIAL ENCOUNTER FOR CLOSED FRACTURE: ICD-10-CM

## 2025-08-08 DIAGNOSIS — M48.061 SPINAL STENOSIS, LUMBAR REGION WITHOUT NEUROGENIC CLAUDICATION: ICD-10-CM

## 2025-08-08 DIAGNOSIS — S32.010A WEDGE COMPRESSION FRACTURE OF FIRST LUMBAR VERTEBRA, INITIAL ENCOUNTER FOR CLOSED FRACTURE: ICD-10-CM

## 2025-08-08 DIAGNOSIS — M70.62 TROCHANTERIC BURSITIS, LEFT HIP: ICD-10-CM

## 2025-08-08 DIAGNOSIS — M51.379 OTH INTVRT DISC DEGEN, LUMBOSACR W/O LUM BCK OR LW EXTRM PN: ICD-10-CM

## 2025-08-08 PROCEDURE — 72100 X-RAY EXAM L-S SPINE 2/3 VWS: CPT

## 2025-08-08 PROCEDURE — 99214 OFFICE O/P EST MOD 30 MIN: CPT

## 2025-08-08 PROCEDURE — 73502 X-RAY EXAM HIP UNI 2-3 VIEWS: CPT
